# Patient Record
Sex: FEMALE | Race: OTHER | NOT HISPANIC OR LATINO | Employment: FULL TIME | URBAN - METROPOLITAN AREA
[De-identification: names, ages, dates, MRNs, and addresses within clinical notes are randomized per-mention and may not be internally consistent; named-entity substitution may affect disease eponyms.]

---

## 2023-10-05 ENCOUNTER — TELEPHONE (OUTPATIENT)
Dept: LACTATION | Facility: CLINIC | Age: 33
End: 2023-10-05
Payer: COMMERCIAL

## 2023-10-09 ENCOUNTER — LACTATION CONSULT (OUTPATIENT)
Dept: LACTATION | Facility: CLINIC | Age: 33
End: 2023-10-09
Payer: COMMERCIAL

## 2023-10-09 DIAGNOSIS — O92.70 LACTATION DISORDER (HHS-HCC): Primary | ICD-10-CM

## 2023-10-09 PROCEDURE — 99211 OFF/OP EST MAY X REQ PHY/QHP: CPT

## 2023-10-09 ASSESSMENT — ENCOUNTER SYMPTOMS
DEPRESSION: 0
OCCASIONAL FEELINGS OF UNSTEADINESS: 0
LOSS OF SENSATION IN FEET: 0

## 2023-10-09 ASSESSMENT — PATIENT HEALTH QUESTIONNAIRE - PHQ9
1. LITTLE INTEREST OR PLEASURE IN DOING THINGS: NOT AT ALL
SUM OF ALL RESPONSES TO PHQ9 QUESTIONS 1 AND 2: 0
2. FEELING DOWN, DEPRESSED OR HOPELESS: NOT AT ALL

## 2023-10-09 NOTE — PROGRESS NOTES
Lactation Counseling Note    Subjective:    Brandi Sharma is a 33 y.o. patient referred for lactation counseling. She is here today due to Sore/cracked nipple(s), Breast pumping concerns/issues, and Latch issues. She was referred by her  self .  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 Varinder FRAZIER    OB HISTORY:   Healthcare Provider: [facility]  Prenatal Screening: Negative  Lower amiotic fluid  Maternal Blood Type: A positive  /Para: : 2  Para: 2  Weeks Gestation: 38 5/7  Mode of Delivery: Primary  section  Epidural/General Anesthesia  spinal  Delivery Complications: Malposition/Malpresentation  breech  Maternal Complications: None  Conger Information: Baby's Name: Kirsten Sharma  : 23  Place of Birth: [place]  Healthcare Provider: [facility]  APGARS: Unknown by parent here today.  Skin to skin contact: Delayed in recovery  Did not breastfeed while skin to skin  Birth parameters: AGA  Birth weight: 6 lb 8 oz  Birth length: 21 inches  Discharge weight: 5 lb 15 oz  Complications: some latching issues, no other concerns    Objective:    BREASTFEEDING ASSESSMENT:   Breast Assessment: Medium, Symmetrical, Soft, and Compressible  Nipple Assessment/Stage: intact, erect with stimulation, distorted shape or flattened, and sore Stage I - Pain or irritation with no skin breakdown  Areola: Normal  Feeding Position: breast sandwich, cross - cradle, laid back, skin to skin, both sides, nipple to nose, mother needs assistance with latch/positioning, and nose or chin not touching breast  Latch: moderate assistance is needed, instructed on deep latch, cries while latching, shallow latch, latch achieved after repeated attempts, mouth open/moves head side to side, mouth not open wide enough, latch is painful, pain during feeding, chin and lower lip contact breast first, bursts of sucking, swallowing, and rest, upper lip turned in, lower lip turned in, chin moves in rhythmic  motion, clenched jaws, minimal audible swallowing, and nipple slides back and forth within baby's mouth  Suck/Feeding: unsustained, coordinated suck/swallow/breathe, clenching/biting, baby led rhythmically, audible swallowing, cheeks dimple during sucking, and supplemented breast  Alternative Feeding Methods: nursing at the breast, feeding expressed breast milk, and paced bottle  Feeding and Cleaning instructions reviewed for: Paced bottle  Infant Feeding Method: Breast milk only  Infant Behavior: crying, fussy, readiness to feed, feeding cues observed, and rooting response  Infant Information: Ankyloglossia  Palate- high/arch/bubble/normal  Poor occlusion of lips around areola  Tongue protrudes over alveolar ridge  Tongue humped/bunched/retracted/elevated  Good cupping of tongue  Fontanel: flat  Mucous Membranes: moist  Breast Pain: Pain scale 0-10 (10 most pain): 0  Nipple Pain: Pain scale 0-10 (10 most pain): 5-6  Pain description: pinch  Before feeding pain 0-10 (10 most pain): 0  After adjustment pain 0-10 (10 most pain): 2  Other pain relief methods: lanolin  Weight: Reported pediatrician visit weight: 6 lb 2 oz  Date of pediatrician weight: 10/7/23  Weight before feedin gm  Weight after feedin gm  Amount of breast milk transferred: 14 ml  Number of voids in the last 24 hours: 6-8  Number of stools in the last 24 hours: 8    PATIENT DISCUSSION SUMMARY:  .  .  Mother and infant seen for concerns over latching and sore nipples.  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      Infant weight gain   slow    .  Infant alert with moist mucous membranes.  Wet diaper in office.    Mother's nipples erect and breasts soft, but infant unable to sustain latch.  Infant retracting and losing latch intermittently, with audible slurp and loss of suction/latch.      Suck assessment abnormal: reveals infant with  "tight maxillary frenulum, and curls upper lip, lip not able to be fully everted.  Infant with good cupping and extension of tongue just past alveolar ridge with posterior humping of tongue, and moderate lateralization at this exam.  Infant not noted to elevate tongue past mid mouth on this exam. Infant with periodic retraction and biting. Palate high and intact to palpation.    Discussed to bottle feed with more upright positioning and anand lips to help infant have wide latch when using bottle by pulling down on chin and everting upper lip.    Mother instructed on suck training exercises.    Pumping session observed with  [employer] S1    pump with  24    mm flanges.  Discussed flange sizing and recommend trying slightly smaller flanges for improved fit. Discussed increasing vacuum to comfort, use of massage and compression, and hand expression after pumping to improve breast emptying.  Mother shown technique for hand expression. Pumping out approximately  30 ml in  24  minutes.    Reviewed post birth warning signs and safe sleep.    Plan:  Mother to do suck training exercises prior to every feed.  Feed at least 8-12 times daily, both breasts for as long as infant is actively sucking and swallowing.  Limit to 10 minutes unless actively swallowing. Use massage and compression to aid transfer.      After every feeding other to pump both breasts at same time for 20 minutes, using massage and compression, with hand expression after to fully drain breast. Feed expressed milk to infant until satisfied.    Will f/u with lactation as needed and with pediatrician 10/11/23.    Denies questions.    LACTATION PROBLEMS AND STRATEGIES:  Low milk supply: \"Power Pump\" (only for full term healthy babies)  Add extra feedings   Allow unrestricted breastfeeding the first few days per week  Check efficiency/comfort of pump and fit of pump breast flange  Finish one breast entirely before offering the second  Gave PI sheet # 162 " "\"Breastfeeding: Tips to Increase Your Milk Supply\"   Have the baby weighed. Weight gain during the first 6 months is 4 to 8 ounces per week  If baby is sleepy, wake for feedings  Increase amount of time spent \"skin to skin\" with baby  Listen to music or guided relaxation during pumping  Make sure the baby takes the breast deeply and transfers milk effectively  Nurse a minimum of 10 -12 times per day. Watch for feeding cues  Pump after feedings as directed to increase milk supply  Talk to your health provider concerning the use of herbs or medication to increase milk supply  The baby may be experiencing a growth spurt. Feed frequently until milk supply increases  Use alternate massage during feedings  Use breast massage during pumping and hand expression after pumping  Warm pump flange or use warm compress on breast when pumping  Do not wait for baby to cry before feeding. Watch for feeding cues-opens mouth, sucking and body movements  Problems latching baby to breast: Baby should latch to areola (dark area) not just the nipple.  Baby's lips should be flanged outward.  Bring the baby up to the level of your breast by putting a pillow under the baby.  Dribble milk over the nipple or express milk so that baby can taste it  Encourage a deeper latch with the asymetrical latch- lining baby's nose opposite mother's nipple.  Gently tickle your baby's lip with your nipple to encourage your baby to open his/her mouth wide.  Hold baby so that your breast is positioned deep in the baby's mouth.  Hold your baby close, tummy to tummy.  If baby does not latch to breast, express breast milk.  If the baby is not latched on well, break seal and gently try again.  Keep baby skin to skin and watch for feeding cues.  Massage breast to start milk-ejection reflex.  Place nipple and at least 1 inch of areola into baby's mouth.  Place your hand behind the baby's neck and shoulder.  Do not force baby's head into breast.  Put baby in the " "football hold or clutch hold, supporting his/her neck and head in sniffing position to open his/her throat.  Shape breast into oval shape (sandwich hold)  for deep latch.  Try different feeding positions (cradle, football, side etc.).  Use a breast feeding pillow to bring baby up to the level of the breast.  Use semi-reclined feeding position to allow baby to take an active role and trigger baby's inborn feeding behavior.  Use your hand to support your breast during feeding.  When your baby's mouth is wide open, quickly pull baby into your breast.  Your baby's chin should be pressed into your breast.  Pumping pointers: Air dry nipples, express milk and rub in or use an approved nipple cream after expressing., Apply heat to breasts before pumping., Choose a familiar comfortable place to express milk., If nipples are sore use less pressure and pump more frequently for shorter times., Listen to a tape of baby while pumping., Look at a photo of baby wile expressing., Massage breasts before and during pumping., Minimize distractions., Moisten flange before beginning to improve seal., Relax for 5 minutes before pumping., Stimulate the nipples and hand express a few drops before pumping., and Use pumping bra/band for \"hands free\" pumping.  Sore nipple (early): After feeding use approved nipple cream or hydrogel pads  Allow baby to self latch  Begin feeding with least sore breast  Break suction with finger before removing baby from breast  Check positioning and attachment throughout the feeding  Do not use plastic lined breast pads. Change breast pads frequently  If experiencing burning or stinging nipple, check for thrush  If nipple pain is too intense to continue breastfeeding, express breastmilk and feed baby expressed breastmilk  If nipple skin is broken, apply a thin coating of a topical antibiotic to prevent infection  Massage breasts and hand express a small amount of milk to encourage let down before feeding  PI " sheet on BF with sore and cracked nipples given  Place baby skin-to-skin on your bare chest  Purified lanolin ointment maybe used on nipples after feedings for comfort  Stimulate a milk ejection by expressing some milk before putting baby to breast  Try holding the baby in different positions (vertical, horizontal, or at 45 degree angle ) for feedings  Use relaxation and breathing techniques to help with pain of latch on  Wash hands before touching breasts and nipples. Rinse nipples with clean warm water  Watch for feeding cues and feed baby  When sore on bottom, make sure lower lip is flanged out and relaxed  When sore on top, correct the latch on and check tongue position  When the tip is sore, center the nipple when latching on, check for tongue thrusting, and check for short frenulum  PATIENT INSTRUCTION HANDOUTS GIVEN:   Tips for pumping with an electric breast pump and milk storage for your healthy baby (PI# 123)  Suck training (PI# 176)  How to make formula safely  How to keep your breast pump kit clean  Foods that promote good milk production  Breastfeeding: Tips to increase your milk supply (PI# 162)  Breastfeeding: Sore and cracked nipples (PI# 167)  Breast massage with milk expression by hand (PI# [id number])  LACTATION EDUCATION:  Correct Positioning, Correct Latch On, and Demo use of Pump

## 2023-10-09 NOTE — PATIENT INSTRUCTIONS
ATIENT DISCUSSION SUMMARY:  .  .  Mother and infant seen for concerns over latching and sore nipples.  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      Infant weight gain   slow    .  Infant alert with moist mucous membranes.  Wet diaper in office.    Mother's nipples erect and breasts soft, but infant unable to sustain latch.  Infant retracting and losing latch intermittently, with audible slurp and loss of suction/latch.      Suck assessment abnormal: reveals infant with tight maxillary frenulum, and curls upper lip, lip not able to be fully everted.  Infant with good cupping and extension of tongue just past alveolar ridge with posterior humping of tongue, and moderate lateralization at this exam.  Infant not noted to elevate tongue past mid mouth on this exam. Infant with periodic retraction and biting. Palate high and intact to palpation.    Discussed to bottle feed with more upright positioning and anand lips to help infant have wide latch when using bottle by pulling down on chin and everting upper lip.    Mother instructed on suck training exercises.    Pumping session observed with  [employer] S1    pump with  24    mm flanges.  Discussed flange sizing and recommend trying slightly smaller flanges for improved fit. Discussed increasing vacuum to comfort, use of massage and compression, and hand expression after pumping to improve breast emptying.  Mother shown technique for hand expression. Pumping out approximately  30 ml in  24  minutes.    Reviewed post birth warning signs and safe sleep.    Plan:  Mother to do suck training exercises prior to every feed.  Feed at least 8-12 times daily, both breasts for as long as infant is actively sucking and swallowing.  Limit to 10 minutes unless actively swallowing. Use massage and compression to aid transfer.      After every feeding other to pump both breasts at same  "time for 20 minutes, using massage and compression, with hand expression after to fully drain breast. Feed expressed milk to infant until satisfied.    Will f/u with lactation as needed and with pediatrician 10/11/23.    Denies questions.    LACTATION PROBLEMS AND STRATEGIES:  Low milk supply: \"Power Pump\" (only for full term healthy babies)  Add extra feedings   Allow unrestricted breastfeeding the first few days per week  Check efficiency/comfort of pump and fit of pump breast flange  Finish one breast entirely before offering the second  Gave PI sheet # 162 \"Breastfeeding: Tips to Increase Your Milk Supply\"   Have the baby weighed. Weight gain during the first 6 months is 4 to 8 ounces per week  If baby is sleepy, wake for feedings  Increase amount of time spent \"skin to skin\" with baby  Listen to music or guided relaxation during pumping  Make sure the baby takes the breast deeply and transfers milk effectively  Nurse a minimum of 10 -12 times per day. Watch for feeding cues  Pump after feedings as directed to increase milk supply  Talk to your health provider concerning the use of herbs or medication to increase milk supply  The baby may be experiencing a growth spurt. Feed frequently until milk supply increases  Use alternate massage during feedings  Use breast massage during pumping and hand expression after pumping  Warm pump flange or use warm compress on breast when pumping  Do not wait for baby to cry before feeding. Watch for feeding cues-opens mouth, sucking and body movements  Problems latching baby to breast: Baby should latch to areola (dark area) not just the nipple.  Baby's lips should be flanged outward.  Bring the baby up to the level of your breast by putting a pillow under the baby.  Dribble milk over the nipple or express milk so that baby can taste it  Encourage a deeper latch with the asymetrical latch- lining baby's nose opposite mother's nipple.  Gently tickle your baby's lip with your " "nipple to encourage your baby to open his/her mouth wide.  Hold baby so that your breast is positioned deep in the baby's mouth.  Hold your baby close, tummy to tummy.  If baby does not latch to breast, express breast milk.  If the baby is not latched on well, break seal and gently try again.  Keep baby skin to skin and watch for feeding cues.  Massage breast to start milk-ejection reflex.  Place nipple and at least 1 inch of areola into baby's mouth.  Place your hand behind the baby's neck and shoulder.  Do not force baby's head into breast.  Put baby in the football hold or clutch hold, supporting his/her neck and head in sniffing position to open his/her throat.  Shape breast into oval shape (sandwich hold)  for deep latch.  Try different feeding positions (cradle, football, side etc.).  Use a breast feeding pillow to bring baby up to the level of the breast.  Use semi-reclined feeding position to allow baby to take an active role and trigger baby's inborn feeding behavior.  Use your hand to support your breast during feeding.  When your baby's mouth is wide open, quickly pull baby into your breast.  Your baby's chin should be pressed into your breast.  Pumping pointers: Air dry nipples, express milk and rub in or use an approved nipple cream after expressing., Apply heat to breasts before pumping., Choose a familiar comfortable place to express milk., If nipples are sore use less pressure and pump more frequently for shorter times., Listen to a tape of baby while pumping., Look at a photo of baby wile expressing., Massage breasts before and during pumping., Minimize distractions., Moisten flange before beginning to improve seal., Relax for 5 minutes before pumping., Stimulate the nipples and hand express a few drops before pumping., and Use pumping bra/band for \"hands free\" pumping.  Sore nipple (early): After feeding use approved nipple cream or hydrogel pads  Allow baby to self latch  Begin feeding with least " sore breast  Break suction with finger before removing baby from breast  Check positioning and attachment throughout the feeding  Do not use plastic lined breast pads. Change breast pads frequently  If experiencing burning or stinging nipple, check for thrush  If nipple pain is too intense to continue breastfeeding, express breastmilk and feed baby expressed breastmilk  If nipple skin is broken, apply a thin coating of a topical antibiotic to prevent infection  Massage breasts and hand express a small amount of milk to encourage let down before feeding  PI sheet on BF with sore and cracked nipples given  Place baby skin-to-skin on your bare chest  Purified lanolin ointment maybe used on nipples after feedings for comfort  Stimulate a milk ejection by expressing some milk before putting baby to breast  Try holding the baby in different positions (vertical, horizontal, or at 45 degree angle ) for feedings  Use relaxation and breathing techniques to help with pain of latch on  Wash hands before touching breasts and nipples. Rinse nipples with clean warm water  Watch for feeding cues and feed baby  When sore on bottom, make sure lower lip is flanged out and relaxed  When sore on top, correct the latch on and check tongue position  When the tip is sore, center the nipple when latching on, check for tongue thrusting, and check for short frenulum  PATIENT INSTRUCTION HANDOUTS GIVEN:   Tips for pumping with an electric breast pump and milk storage for your healthy baby (PI# 123)  Suck training (PI# 176)  How to make formula safely  How to keep your breast pump kit clean  Foods that promote good milk production  Breastfeeding: Tips to increase your milk supply (PI# 162)  Breastfeeding: Sore and cracked nipples (PI# 167)  Breast massage with milk expression by hand (PI# [id number])  LACTATION EDUCATION:  Correct Positioning, Correct Latch On, and Demo use of Pump

## 2023-10-10 ENCOUNTER — TELEPHONE (OUTPATIENT)
Dept: LACTATION | Facility: CLINIC | Age: 33
End: 2023-10-10
Payer: COMMERCIAL

## 2023-10-25 ENCOUNTER — LACTATION CONSULT (OUTPATIENT)
Dept: LACTATION | Facility: CLINIC | Age: 33
End: 2023-10-25
Payer: COMMERCIAL

## 2023-10-25 DIAGNOSIS — O92.70 LACTATION DISORDER (HHS-HCC): Primary | ICD-10-CM

## 2023-10-25 PROCEDURE — 99211 OFF/OP EST MAY X REQ PHY/QHP: CPT

## 2023-10-25 ASSESSMENT — ENCOUNTER SYMPTOMS
LOSS OF SENSATION IN FEET: 0
OCCASIONAL FEELINGS OF UNSTEADINESS: 0
DEPRESSION: 0

## 2023-10-25 ASSESSMENT — PATIENT HEALTH QUESTIONNAIRE - PHQ9
1. LITTLE INTEREST OR PLEASURE IN DOING THINGS: NOT AT ALL
2. FEELING DOWN, DEPRESSED OR HOPELESS: NOT AT ALL
SUM OF ALL RESPONSES TO PHQ9 QUESTIONS 1 AND 2: 0

## 2023-10-25 NOTE — PATIENT INSTRUCTIONS
.  .  Mother and infant seen s/p lingual and maxillary frenotomy 10/24/23  Mother has been feeding primarily by bottle expressed milk or formula 3-4 oz about  every 3 hours.    Infant weight gain excellent gaining 59 g per day since last visit    Infant was feed 2 hours prior to visit and was still spitting up.  Infant would not sustain for nutritive sucking with or without the 20 mm nipple shield.  Mother reports latch attempts as comfortable and nipple rounded after feeds.    Suck assessment reveals infant with improved extension, elevation and lateralization.  Cupping remains good.  Less humping of tongue noted, and infant still not opening wide but  improved flange of both upper and lower lips.    Infant still fatigues during feeding and did primarily non-nutritive sucking at this attempt.  Discussed with mother that suck will improve over time as tongue strengthens and infant learns to use previously unused muscles.  Encouraged as much feeding at the breast as possible.  Discussed potential use of SNS if supplement needed to decrease nipple confusion/ flow preferences.    Reviewed post birth warning signs and safe sleep    Plan:  Mother to continue suck training exercises prior to feeds, and stretching exercises as recommended by frenotomy provider.  Offer one oz of milk via bottle before attempting, then allow infant to suckle as long as is have active sucks and swallows.  Limit to 10 minutes if not actively swallowing. Mother to feed infant on demand at least 8-12 times daily, preferably skin to skin in laid back position to facilitate forward tongue positioning and deep latch.  Mother to use breast massage and compression during feeding, to facilitate milk transfer.  Mother to offer expressed milk or formula, if does not have sufficient expressed milk and infant still hungry.  To continue to pump both breasts after feeds for 15-20 minutes, until infant more efficient with transfer.    To follow up with  pediatrician 10/27/23, and with lactation as desires.    Denies questions  LACTATION PROBLEMS AND STRATEGIES:  Problems latching baby to breast: Baby should latch to areola (dark area) not just the nipple.  Baby's lips should be flanged outward.  Bring the baby up to the level of your breast by putting a pillow under the baby.  Do not use bottles or pacifiers until latching on well.  Dribble milk over the nipple or express milk so that baby can taste it  Encourage a deeper latch with the asymetrical latch- lining baby's nose opposite mother's nipple.  Encourage nipple to stand up prior to feeing by pumping, nipple rolling or by brief application of ice.  Gently tickle your baby's lip with your nipple to encourage your baby to open his/her mouth wide.  Hold baby so that your breast is positioned deep in the baby's mouth.  Hold your baby close, tummy to tummy.  If baby does not latch to breast, express breast milk.  If engorged, express some milk to soften breast.  If the baby is not latched on well, break seal and gently try again.  Keep baby skin to skin and watch for feeding cues.  Massage breast to start milk-ejection reflex.  Place nipple and at least 1 inch of areola into baby's mouth.  Place your hand behind the baby's neck and shoulder.  Do not force baby's head into breast.  Put baby in the football hold or clutch hold, supporting his/her neck and head in sniffing position to open his/her throat.  Shape breast into oval shape (sandwich hold)  for deep latch.  Try different feeding positions (cradle, football, side etc.).  Use a breast feeding pillow to bring baby up to the level of the breast.  Use nipple shield and monitor baby's weight gain and output.  Use semi-reclined feeding position to allow baby to take an active role and trigger baby's inborn feeding behavior.  Use your hand to support your breast during feeding.  When your baby's mouth is wide open, quickly pull baby into your breast.  Your baby's  chin should be pressed into your breast.  Reflux: After feedings keep baby upright for 20-30 minutes.  At diaper changes, avoid lifting baby's legs; instead roll baby on his left side.  Avoid putting baby in a car seat except when riding in a car.  Breastfeed often for smaller amounts of baby's stomach.  Breastfeed with baby's head higher than bottom at about a 45 degree angle.  See pediatrician or baby's healthcare provider.  Spitting up: Count wet diapers and stools to ensure baby is getting enough to eat, Eliminate supplements, Gentle handling of baby. Keep baby upright after feedings, If projectile vomiting between 2 and 8 weeks of age, call your physician, If the baby gulps and chokes after letdown, take the baby off of the breast until initial flow subsides, Limit to 1 breast per feeding if spitting up is due to too much milk at each feeding, and Temporarily stop vitamin, iron, or fluoride supplements  PATIENT INSTRUCTION HANDOUTS GIVEN:   Nipple shields (PI# 85)  LACTATION EDUCATION:  Correct Positioning, Correct Latch On, and nipple shield care and risks

## 2023-10-25 NOTE — PROGRESS NOTES
Lactation Counseling Note    Subjective:    Brandi Sharma is a 33 y.o. patient referred for lactation counseling. She is here today due to latching issues. Infant s/p lingual and maxillary frenotomy 10/24/23. She was referred by her  self . Mother has been feeding primarily by bottle expressed milk or formula 3-4 oz about  every 3 hours    OB HISTORY:   Healthcare Provider: [facility]   Information: Baby's Name: Kirsten Sharma  : 23  Healthcare Provider: candace  Birth weight: 6 lb 8 oz    Objective:    BREASTFEEDING ASSESSMENT:   Breast Assessment: Medium, Symmetrical, Soft, and Compressible  Nipple Assessment/Stage: intact, erect with stimulation, and rounded after feeding not sore, intant, has not been putting infant to breast  Areola: Normal  Feeding Position: breast sandwich, cross - cradle, laid back, football/seated, skin to skin, mother needs assistance with latch/positioning, nose or chin not touching breast, and misalignment of baby's head, trunk, and hips  Latch: moderate assistance is needed, instructed on deep latch, shallow latch, mouth not open wide enough, comfortable with no pain, chin and lower lip contact breast first, bursts of sucking, swallowing, and rest, clenched jaws, nipple - slurping/pulls/nibbles, and fussy at breast  Suck/Feeding: unsustained, coordinated suck/swallow/breathe, non-nutritional sucking, audible swallowing with stimulation, cheeks dimple during sucking, supplemented breast, nipple shield used, and supplemented  Alternative Feeding Methods: feeding expressed breast milk and paced bottle  Feeding and Cleaning instructions reviewed for: Paced bottle  Infant Feeding Method: Breast milk and Formula  Infant Behavior: awake, fussy, readiness to feed, and hiccups during feeding attempt  Infant Information: Ankyloglossia  Post status frenotomy  Palate- high/arch/bubble/normal  Tongue protrudes over alveolar ridge  Good cupping of tongue  Good lateral movement of  the tongue  Fontanel: flat  Mucous Membranes: moist  No other concerns  Breast Pain: Pain scale 0-10 (10 most pain): 0  Nipple Pain: Pain scale 0-10 (10 most pain): 0  Weight: Reported pediatrician visit weight: 6 lb 2 oz  Date of pediatrician weight: 10/7/23  Weight before feedin gm  Weight after feedin gm  Amount of breast milk transferred: 8 ml  Number of voids in the last 24 hours: 6-8  Number of stools in the last 24 hours: 6-8  No other concerns    PATIENT DISCUSSION SUMMARY:  .  .  Mother and infant seen s/p lingual and maxillary frenotomy 10/24/23  Mother has been feeding primarily by bottle expressed milk or formula 3-4 oz about  every 3 hours.    Infant weight gain excellent gaining 59 g per day since last visit    Infant was feed 2 hours prior to visit and was still spitting up.  Infant would not sustain for nutritive sucking with or without the 20 mm nipple shield.  Mother reports latch attempts as comfortable and nipple rounded after feeds.    Suck assessment reveals infant with improved extension, elevation and lateralization.  Cupping remains good.  Less humping of tongue noted, and infant still not opening wide but  improved flange of both upper and lower lips.    Infant still fatigues during feeding and did primarily non-nutritive sucking at this attempt.  Discussed with mother that suck will improve over time as tongue strengthens and infant learns to use previously unused muscles.  Encouraged as much feeding at the breast as possible.  Discussed potential use of SNS if supplement needed to decrease nipple confusion/ flow preferences.    Reviewed post birth warning signs and safe sleep    Plan:  Mother to continue suck training exercises prior to feeds, and stretching exercises as recommended by frenotomy provider.  Offer one oz of milk via bottle before attempting, then allow infant to suckle as long as is have active sucks and swallows.  Limit to 10 minutes if not actively  swallowing. Mother to feed infant on demand at least 8-12 times daily, preferably skin to skin in laid back position to facilitate forward tongue positioning and deep latch.  Mother to use breast massage and compression during feeding, to facilitate milk transfer.  Mother to offer expressed milk or formula, if does not have sufficient expressed milk and infant still hungry.  To continue to pump both breasts after feeds for 15-20 minutes, until infant more efficient with transfer.    To follow up with pediatrician 10/27/23, and with lactation as desires.    Denies questions  LACTATION PROBLEMS AND STRATEGIES:  Problems latching baby to breast: Baby should latch to areola (dark area) not just the nipple.  Baby's lips should be flanged outward.  Bring the baby up to the level of your breast by putting a pillow under the baby.  Do not use bottles or pacifiers until latching on well.  Dribble milk over the nipple or express milk so that baby can taste it  Encourage a deeper latch with the asymetrical latch- lining baby's nose opposite mother's nipple.  Encourage nipple to stand up prior to feeing by pumping, nipple rolling or by brief application of ice.  Gently tickle your baby's lip with your nipple to encourage your baby to open his/her mouth wide.  Hold baby so that your breast is positioned deep in the baby's mouth.  Hold your baby close, tummy to tummy.  If baby does not latch to breast, express breast milk.  If engorged, express some milk to soften breast.  If the baby is not latched on well, break seal and gently try again.  Keep baby skin to skin and watch for feeding cues.  Massage breast to start milk-ejection reflex.  Place nipple and at least 1 inch of areola into baby's mouth.  Place your hand behind the baby's neck and shoulder.  Do not force baby's head into breast.  Put baby in the football hold or clutch hold, supporting his/her neck and head in sniffing position to open his/her throat.  Shape breast  into oval shape (sandwich hold)  for deep latch.  Try different feeding positions (cradle, football, side etc.).  Use a breast feeding pillow to bring baby up to the level of the breast.  Use nipple shield and monitor baby's weight gain and output.  Use semi-reclined feeding position to allow baby to take an active role and trigger baby's inborn feeding behavior.  Use your hand to support your breast during feeding.  When your baby's mouth is wide open, quickly pull baby into your breast.  Your baby's chin should be pressed into your breast.  Reflux: After feedings keep baby upright for 20-30 minutes.  At diaper changes, avoid lifting baby's legs; instead roll baby on his left side.  Avoid putting baby in a car seat except when riding in a car.  Breastfeed often for smaller amounts of baby's stomach.  Breastfeed with baby's head higher than bottom at about a 45 degree angle.  See pediatrician or baby's healthcare provider.  Spitting up: Count wet diapers and stools to ensure baby is getting enough to eat, Eliminate supplements, Gentle handling of baby. Keep baby upright after feedings, If projectile vomiting between 2 and 8 weeks of age, call your physician, If the baby gulps and chokes after letdown, take the baby off of the breast until initial flow subsides, Limit to 1 breast per feeding if spitting up is due to too much milk at each feeding, and Temporarily stop vitamin, iron, or fluoride supplements  PATIENT INSTRUCTION HANDOUTS GIVEN:   Nipple shields (PI# 85)  LACTATION EDUCATION:  Correct Positioning, Correct Latch On, and nipple shield care and risks

## 2023-10-26 ENCOUNTER — TELEPHONE (OUTPATIENT)
Dept: LACTATION | Facility: CLINIC | Age: 33
End: 2023-10-26
Payer: COMMERCIAL

## 2023-10-26 NOTE — LACTATION NOTE
Follow up phone call for lactation visit. Left message for patient to call IBCLC at [phone] to discuss progress or concerns.